# Patient Record
Sex: FEMALE | URBAN - METROPOLITAN AREA
[De-identification: names, ages, dates, MRNs, and addresses within clinical notes are randomized per-mention and may not be internally consistent; named-entity substitution may affect disease eponyms.]

---

## 2017-10-13 ENCOUNTER — OFFICE VISIT (OUTPATIENT)
Dept: FAMILY MEDICINE | Facility: CLINIC | Age: 27
End: 2017-10-13
Payer: COMMERCIAL

## 2017-10-13 VITALS
SYSTOLIC BLOOD PRESSURE: 118 MMHG | DIASTOLIC BLOOD PRESSURE: 84 MMHG | OXYGEN SATURATION: 99 % | WEIGHT: 224 LBS | BODY MASS INDEX: 42.29 KG/M2 | TEMPERATURE: 98.3 F | HEIGHT: 61 IN | HEART RATE: 97 BPM

## 2017-10-13 DIAGNOSIS — F32.1 MODERATE MAJOR DEPRESSION (H): ICD-10-CM

## 2017-10-13 DIAGNOSIS — Z00.00 HEALTHCARE MAINTENANCE: ICD-10-CM

## 2017-10-13 DIAGNOSIS — J01.10 ACUTE NON-RECURRENT FRONTAL SINUSITIS: Primary | ICD-10-CM

## 2017-10-13 LAB
ANION GAP SERPL CALCULATED.3IONS-SCNC: 8 MMOL/L (ref 3–14)
BUN SERPL-MCNC: 8 MG/DL (ref 7–30)
CALCIUM SERPL-MCNC: 8.7 MG/DL (ref 8.5–10.1)
CHLORIDE SERPL-SCNC: 106 MMOL/L (ref 94–109)
CO2 SERPL-SCNC: 26 MMOL/L (ref 20–32)
CREAT SERPL-MCNC: 0.65 MG/DL (ref 0.52–1.04)
GFR SERPL CREATININE-BSD FRML MDRD: >90 ML/MIN/1.7M2
GLUCOSE SERPL-MCNC: 92 MG/DL (ref 70–99)
POTASSIUM SERPL-SCNC: 3.4 MMOL/L (ref 3.4–5.3)
SODIUM SERPL-SCNC: 140 MMOL/L (ref 133–144)
TSH SERPL DL<=0.005 MIU/L-ACNC: 0.8 MU/L (ref 0.4–4)

## 2017-10-13 PROCEDURE — 82306 VITAMIN D 25 HYDROXY: CPT | Performed by: NURSE PRACTITIONER

## 2017-10-13 PROCEDURE — 36415 COLL VENOUS BLD VENIPUNCTURE: CPT | Performed by: NURSE PRACTITIONER

## 2017-10-13 PROCEDURE — 99214 OFFICE O/P EST MOD 30 MIN: CPT | Performed by: NURSE PRACTITIONER

## 2017-10-13 PROCEDURE — 80048 BASIC METABOLIC PNL TOTAL CA: CPT | Performed by: NURSE PRACTITIONER

## 2017-10-13 PROCEDURE — 84443 ASSAY THYROID STIM HORMONE: CPT | Performed by: NURSE PRACTITIONER

## 2017-10-13 RX ORDER — TRAZODONE HYDROCHLORIDE 50 MG/1
50 TABLET, FILM COATED ORAL DAILY PRN
COMMUNITY
Start: 2017-08-10 | End: 2018-04-02

## 2017-10-13 RX ORDER — TRAMADOL HYDROCHLORIDE 50 MG/1
50 TABLET ORAL EVERY 6 HOURS PRN
COMMUNITY
End: 2017-10-13

## 2017-10-13 ASSESSMENT — ANXIETY QUESTIONNAIRES
GAD7 TOTAL SCORE: 18
1. FEELING NERVOUS, ANXIOUS, OR ON EDGE: NEARLY EVERY DAY
IF YOU CHECKED OFF ANY PROBLEMS ON THIS QUESTIONNAIRE, HOW DIFFICULT HAVE THESE PROBLEMS MADE IT FOR YOU TO DO YOUR WORK, TAKE CARE OF THINGS AT HOME, OR GET ALONG WITH OTHER PEOPLE: VERY DIFFICULT
6. BECOMING EASILY ANNOYED OR IRRITABLE: NEARLY EVERY DAY
2. NOT BEING ABLE TO STOP OR CONTROL WORRYING: NEARLY EVERY DAY
3. WORRYING TOO MUCH ABOUT DIFFERENT THINGS: NEARLY EVERY DAY
7. FEELING AFRAID AS IF SOMETHING AWFUL MIGHT HAPPEN: SEVERAL DAYS
5. BEING SO RESTLESS THAT IT IS HARD TO SIT STILL: MORE THAN HALF THE DAYS

## 2017-10-13 ASSESSMENT — PATIENT HEALTH QUESTIONNAIRE - PHQ9
SUM OF ALL RESPONSES TO PHQ QUESTIONS 1-9: 12
5. POOR APPETITE OR OVEREATING: NEARLY EVERY DAY

## 2017-10-13 ASSESSMENT — PAIN SCALES - GENERAL: PAINLEVEL: SEVERE PAIN (7)

## 2017-10-13 NOTE — NURSING NOTE
"Chief Complaint   Patient presents with     URI       Initial /84 (BP Location: Left arm, Patient Position: Chair, Cuff Size: Adult Large)  Pulse 97  Temp 98.3  F (36.8  C) (Oral)  Ht 5' 1\" (1.549 m)  Wt 224 lb (101.6 kg)  LMP 06/15/2017  SpO2 99%  Breastfeeding? No  BMI 42.32 kg/m2 Estimated body mass index is 42.32 kg/(m^2) as calculated from the following:    Height as of this encounter: 5' 1\" (1.549 m).    Weight as of this encounter: 224 lb (101.6 kg).  Medication Reconciliation: complete.  JOSSELYN Ulloa MA      "

## 2017-10-13 NOTE — PROGRESS NOTES
SUBJECTIVE:   Jaylin Mcgill is a 26 year old female who presents to clinic today for the following health issues:      Acute Illness   Acute illness concerns: Sinus  Onset: 2 weeks    Fever: no    Chills/Sweats: YES    Headache (location?): YES- sinus headache    Sinus Pressure:YES- post-nasal drainage and facial pain    Conjunctivitis:  no    Ear Pain: no    Rhinorrhea: YES    Congestion: YES- nasal and chest    Sore Throat: no     Cough: YES-productive of green sputum    Wheeze: no    Decreased Appetite: no    Nausea: no    Vomiting: no    Diarrhea:  no    Dysuria/Freq.: no    Fatigue/Achiness: YES    Sick/Strep Exposure: no     Therapies Tried and outcome: Nightquil and Claritin     Previously following at [facility]  Would like to establish care here  Bilateral sinus pain and pressure for 2 weeks  Recently worsening  Subjective fevers  OTC not helping  Denies SOB    Hx of MDDD  Is following with a psychologist  Tried and failed:  Prozac - weight gain  Wellbutrin - nightmares  Lexapro - weight gain  She is currently taking Trazodone for sleep. 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  Additional history: none    Patient Active Problem List   Diagnosis     Major depressive disorder, recurrent episode, moderate (H)     Vitamin D deficiency     History reviewed. No pertinent surgical history.    Social History   Substance Use Topics     Smoking status: Never Smoker     Smokeless tobacco: Never Used     Alcohol use Yes      Comment: occ.     History reviewed. No pertinent family history.          Reviewed and updated as needed this visit by clinical staffTobacco  Meds  Med Hx  Surg Hx  Fam Hx  Soc Hx      Reviewed and updated as needed this visit by Provider         ROS:  Constitutional, HEENT, cardiovascular, pulmonary, gi and gu systems are  "negative, except as otherwise noted.      OBJECTIVE:   /84 (BP Location: Left arm, Patient Position: Chair, Cuff Size: Adult Large)  Pulse 97  Temp 98.3  F (36.8  C) (Oral)  Ht 5' 1\" (1.549 m)  Wt 224 lb (101.6 kg)  LMP 06/15/2017  SpO2 99%  Breastfeeding? No  BMI 42.32 kg/m2  Body mass index is 42.32 kg/(m^2).  GENERAL: healthy, alert and no distress  HENT: normal cephalic/atraumatic, ear canals and TM's normal, nose and mouth without ulcers or lesions, nasal mucosa edematous , rhinorrhea yellow, oropharynx clear, oral mucous membranes moist and sinuses: frontal tenderness on bilateral  NECK: no adenopathy, no asymmetry, masses, or scars and thyroid normal to palpation  RESP: lungs clear to auscultation - no rales, rhonchi or wheezes  CV: regular rate and rhythm, normal S1 S2, no S3 or S4, no murmur, click or rub, no peripheral edema and peripheral pulses strong    Diagnostic Test Results:  none     ASSESSMENT/PLAN:   1. Acute non-recurrent frontal sinusitis  - Recommend Abx treatment with symptoms present 2 weeks and worsening. Reviewed self cares and over the counter medications for symptomatic relief  - amoxicillin-clavulanate (AUGMENTIN) 875-125 MG per tablet; Take 1 tablet by mouth 2 times daily  Dispense: 20 tablet; Refill: 0    2. Moderate major depression (H)  - Recommend SSRI for depression and anxiety. Will try Zoloft. Reviewed risks and benefits, including increased risk of suicide. Follow up in 1 month  - sertraline (ZOLOFT) 50 MG tablet; Take 1/2 tablet (25 mg) for 1-2 weeks, then increase to 1 tablet orally daily  Dispense: 30 tablet; Refill: 0    3. Healthcare maintenance  - TSH with free T4 reflex  - Vitamin D Deficiency  - Basic metabolic panel    Patient Instructions   Follow up in 1 month for depression  Follow up sooner if mood is worsening  Try 1/2 tablet of Trazodone to see if that helps with morning grogginess      CESAR Sharif [facility]  "

## 2017-10-13 NOTE — PATIENT INSTRUCTIONS
Follow up in 1 month for depression  Follow up sooner if mood is worsening  Try 1/2 tablet of Trazodone to see if that helps with morning grogginess

## 2017-10-13 NOTE — MR AVS SNAPSHOT
"              After Visit Summary   10/13/2017    Jaylin Mcgill    MRN: [id number]           Patient Information     Date Of Birth          1990        Visit Information        Provider Department      10/13/2017 2:00 PM Nancy Devi APRN CNP [facility]        Today's Diagnoses     Acute non-recurrent frontal sinusitis    -  1    Moderate major depression (H)        Healthcare maintenance          Care Instructions    Follow up in 1 month for depression  Follow up sooner if mood is worsening  Try 1/2 tablet of Trazodone to see if that helps with morning grogginess          Follow-ups after your visit        Who to contact     If you have questions or need follow up information about today's clinic visit or your schedule please contact [facility] directly at [phone].  Normal or non-critical lab and imaging results will be communicated to you by MyChart, letter or phone within 4 business days after the clinic has received the results. If you do not hear from us within 7 days, please contact the clinic through MyChart or phone. If you have a critical or abnormal lab result, we will notify you by phone as soon as possible.  Submit refill requests through [email or web address] or call your pharmacy and they will forward the refill request to us. Please allow 3 business days for your refill to be completed.          Additional Information About Your Visit        [employer] Information     [email or web address] lets you send messages to your doctor, view your test results, renew your prescriptions, schedule appointments and more. To sign up, go to [email or web address] . Click on \"Log in\" on the left side of the screen, which will take you to the Welcome page. Then click on \"Sign up Now\" on the right side of the page.     You will be asked to enter the access code listed below, as well as some personal information. Please follow the directions to create your username and " "password.     Your access code is: [email or web address]  Expires: 2018  2:59 PM     Your access code will  in 90 days. If you need help or a new code, please call your [facility] or [phone].        Care EveryWhere ID     This is your Care EveryWhere ID. This could be used by other organizations to access your [facility] medical records  [id number]        Your Vitals Were     Pulse Temperature Height Last Period Pulse Oximetry Breastfeeding?    97 98.3  F (36.8  C) (Oral) 5' 1\" (1.549 m) 06/15/2017 99% No    BMI (Body Mass Index)                   42.32 kg/m2            Blood Pressure from Last 3 Encounters:   10/13/17 118/84    Weight from Last 3 Encounters:   10/13/17 224 lb (101.6 kg)              We Performed the Following     Basic metabolic panel     TSH with free T4 reflex     Vitamin D Deficiency          Today's Medication Changes          These changes are accurate as of: 10/13/17  2:59 PM.  If you have any questions, ask your nurse or doctor.               Start taking these medicines.        Dose/Directions    amoxicillin-clavulanate 875-125 MG per tablet   Commonly known as:  AUGMENTIN   Used for:  Acute non-recurrent frontal sinusitis   Started by:  Nancy Devi APRN CNP        Dose:  1 tablet   Take 1 tablet by mouth 2 times daily   Quantity:  20 tablet   Refills:  0       sertraline 50 MG tablet   Commonly known as:  ZOLOFT   Used for:  Moderate major depression (H)   Started by:  Nancy Devi APRN CNP        Take 1/2 tablet (25 mg) for 1-2 weeks, then increase to 1 tablet orally daily   Quantity:  30 tablet   Refills:  0            Where to get your medicines      These medications were sent to [facility]/pharmacy #[id number] - [place], [place] - [address] [facility]  [address], [facility] [address]     Phone:  [phone]     amoxicillin-clavulanate 875-125 MG per tablet    sertraline 50 MG tablet                Primary Care Provider Office " Phone # Fax #    CESAR Snow [phone] [address] [address]        Equal Access to Services     GREGORY UMANA : Hadii aad ku hadloreneo Soomaali, waaxda luqadaha, qaybta kaalmada adeegyada, john sweeneymateo arnettjanice ellisazael cortes. So [facility] [phone].    ATENCIÓN: Si habla español, tiene a ramos disposición servicios gratuitos de asistencia lingüística. Llame al [phone].    We comply with applicable federal civil rights laws and [place] laws. We do not discriminate on the basis of race, color, national origin, age, disability, sex, sexual orientation, or gender identity.            Thank you!     Thank you for choosing [facility]  for your care. Our goal is always to provide you with excellent care. Hearing back from our patients is one way we can continue to improve our services. Please take a few minutes to complete the written survey that you may receive in the mail after your visit with us. Thank you!             Your Updated Medication List - Protect others around you: Learn how to safely use, store and throw away your medicines at www.disposemymeds.org.          This list is accurate as of: 10/13/17  2:59 PM.  Always use your most recent med list.                   Brand Name Dispense Instructions for use Diagnosis    amoxicillin-clavulanate 875-125 MG per tablet    AUGMENTIN    20 tablet    Take 1 tablet by mouth 2 times daily    Acute non-recurrent frontal sinusitis       sertraline 50 MG tablet    ZOLOFT    30 tablet    Take 1/2 tablet (25 mg) for 1-2 weeks, then increase to 1 tablet orally daily    Moderate major depression (H)       DAVID 13.5 MG Iud   Generic drug:  Levonorgestrel           traZODone 50 MG tablet    DESYREL     Take 50 mg by mouth daily as needed

## 2017-10-14 ASSESSMENT — ANXIETY QUESTIONNAIRES: GAD7 TOTAL SCORE: 18

## 2017-10-15 LAB — DEPRECATED CALCIDIOL+CALCIFEROL SERPL-MC: 7 UG/L (ref 20–75)

## 2017-10-16 ENCOUNTER — TELEPHONE (OUTPATIENT)
Dept: FAMILY MEDICINE | Facility: CLINIC | Age: 27
End: 2017-10-16

## 2017-10-16 DIAGNOSIS — E55.9 VITAMIN D DEFICIENCY: Primary | ICD-10-CM

## 2017-10-16 NOTE — TELEPHONE ENCOUNTER
Please let patient know that her vitamin D is low. This can contribute to fatigue and lethargy. I sent a prescription for high dose vitamin D to your pharmacy. Take 1 capsule once a week for 8 weeks. After that take a daily low dose vitamin D. This can be bought over the counter. Look for 2000 IU vitamin D daily.   Your other labs look good

## 2017-10-16 NOTE — TELEPHONE ENCOUNTER
Attempted to call patient at [phone] (home), no answer.  Left VM to return call to RN Triage line.    Florinda Briseno RN  [facility] [[email or web address]]

## 2017-10-16 NOTE — TELEPHONE ENCOUNTER
"Call taken, advised patient of her lab results, vit D is low, Rx sent then to start over the counter Vit D when the weekly high dose is done in 8 weeks.  She says she will call to schedule the advised 1 month follow up for \"mood\" when she knows her work schedule.    Rupinder Parra RN  [facility]      "

## 2017-12-09 DIAGNOSIS — E55.9 VITAMIN D DEFICIENCY: ICD-10-CM

## 2017-12-12 RX ORDER — METHOCARBAMOL 750 MG/1
TABLET ORAL
Qty: 8 CAPSULE | Refills: 0 | OUTPATIENT
Start: 2017-12-12

## 2017-12-12 NOTE — TELEPHONE ENCOUNTER
"Vit D 50,000 units      Last Written Prescription Date: 10/16/17  Last Fill Quantity: 8,  # refills: 0   Last Office Visit with [facility], [facility] or [facility] prescribing provider: 10/13/17 to establish care                                         Next 5 appointments (look out 90 days)     Dec 18, 2017  6:20 PM CST   [facility]-C   [facility]    [address] [place] [id number] [phone]                  See plan regarding vitamin D from phone call result note 10/16/17:    Please let patient know that her vitamin D is low. This can contribute to fatigue and lethargy. I sent a prescription for high dose vitamin D to your pharmacy. Take 1 capsule once a week for 8 weeks. After that take a daily low dose vitamin D. This can be bought over the counter. Look for 2000 IU vitamin D daily.   Your other labs look good      I called patient and advised her of plan, she verbalized understanding.    \"Refusal\" routed back to pharmacy with note.  Rupinder Parra RN  [facility]        "

## 2018-01-28 ENCOUNTER — HEALTH MAINTENANCE LETTER (OUTPATIENT)
Age: 28
End: 2018-01-28

## 2018-03-06 ENCOUNTER — TELEPHONE (OUTPATIENT)
Dept: FAMILY MEDICINE | Facility: CLINIC | Age: 28
End: 2018-03-06

## 2018-03-06 NOTE — LETTER
March 19, 2018    Jaylin Mcgill  [address]  [place] [address]    Dear Jaylin    We care about your health and have reviewed your health plan. We have reviewed your medical conditions, medication list, and lab results and are making recommendations based on this review, to better manage your health.    You are in particular need of attention regarding:  - Your Depression      Here is a list of Health Maintenance topics that are due now or due soon:  Health Maintenance Due   Topic Date Due     HPV IMMUNIZATION (1 of 3 - Female 3 Dose Series) 11/15/2003     DEPRESSION ACTION PLAN Q1 YR  11/15/2010     PAP SCREENING Q3 YR (SYSTEM ASSIGNED)  11/15/2013     PHQ-9 Q6 MONTHS  04/13/2018     We will be calling you in the next couple of weeks to help you schedule any appointments that are needed.  Please call us at [phone] (or use [facility]) to address the above recommendations.     Thank you for trusting [facility] and we appreciate the opportunity to serve you.  We look forward to supporting your healthcare needs in the future.    Healthy Regards,    Nancy Devi, ALEXANDER/cm

## 2018-03-06 NOTE — TELEPHONE ENCOUNTER
Panel Management Review      Patient has the following on her problem list:     Depression / Dysthymia review    Measure:  Needs PHQ-9 score of 4 or less during index window.  Administer PHQ-9 and if score is 5 or more, send encounter to provider for next steps.    5   7 month window range: 3/13/18-5/13/18    PHQ-9 SCORE 10/13/2017   Total Score 12       If PHQ-9 recheck is 5 or more, route to provider for next steps.    Patient is due for:  PHQ9 and DAP      Composite cancer screening  Chart review shows that this patient is due/due soon for the following Pap Smear  Summary:    Patient is due/failing the following:   DAP, PAP and PHYSICAL    Action needed:   Patient needs office visit for pap screening. and Patient needs to do PHQ9.    Type of outreach:    Phone, spoke to patient.  completed PHQ9 over the phone, her score is 7.    Questions for provider review:    Sending to PCP for review and advice.   When finish reviewing please send back to Any BENÍTEZ to close chart, thank you.                                                                                                                                 JOSSELYN Ulloa MA       Chart routed to Provider .

## 2018-03-06 NOTE — LETTER
Please complete the PHQ9 questionnaire and mail back to the clinic in the self addressed envelope provided, thank you.    Enclosed with this letter is a questionnaire about depression for your upcoming visit. Please fill this out and mail back or contact us. We care about you and want to make sure you get the best care possible. If at any time you feel unsafe or have concerns about safety of others please take  immediate action by calling [phone], for Mental Health Crisis phone support 24 hours a day, 365 days per year. As always, you can also go the your local Emergency Room, or call 911 if you have immediate safety concerns.    Nancy Devi CNP/cm

## 2018-03-19 NOTE — TELEPHONE ENCOUNTER
Quality and PHQ9 questionnaire letter mailed to patient as a reminder of HM items due.  JOSSELYN Ulloa MA

## 2018-03-23 NOTE — TELEPHONE ENCOUNTER
Nancy I spoke with the patient and she has no insurance at this time, her employer doesn't supply it for her and she doesn't qualify for Mnsure, she is not a citizen, she is her on a work visa. Is interested in the phone visit if you think it is appropriate.  JOSSELYN Ulloa MA

## 2018-03-23 NOTE — TELEPHONE ENCOUNTER
Please call patient to help schedule follow up for depression. She was supposed to follow up 1 month after last appointment in October. PHQ9 score shows we are still poorly managing her depression

## 2018-03-24 ASSESSMENT — PATIENT HEALTH QUESTIONNAIRE - PHQ9: SUM OF ALL RESPONSES TO PHQ QUESTIONS 1-9: 7

## 2018-03-26 NOTE — TELEPHONE ENCOUNTER
Patient returning call, left message on TC line.  OK to leave detailed message on home/cell number, listed.

## 2018-04-02 ENCOUNTER — VIRTUAL VISIT (OUTPATIENT)
Dept: FAMILY MEDICINE | Facility: CLINIC | Age: 28
End: 2018-04-02

## 2018-04-02 DIAGNOSIS — F33.1 MAJOR DEPRESSIVE DISORDER, RECURRENT EPISODE, MODERATE (H): Primary | ICD-10-CM

## 2018-04-02 PROCEDURE — 99213 OFFICE O/P EST LOW 20 MIN: CPT | Performed by: NURSE PRACTITIONER

## 2018-04-02 RX ORDER — SERTRALINE HYDROCHLORIDE 100 MG/1
100 TABLET, FILM COATED ORAL DAILY
Qty: 30 TABLET | Refills: 1 | Status: SHIPPED | OUTPATIENT
Start: 2018-04-02

## 2018-04-02 NOTE — PROGRESS NOTES
"Jaylin Mcgill is a 27 year old female who is being evaluated via a telephone visit.      The patient has been notified of following:     \"This telephone visit will be conducted via a call between you and your physician/provider. We have found that certain health care needs can be provided without the need for a physical exam.  This service lets us provide the care you need with a short phone conversation.  If a prescription is necessary we can send it directly to your pharmacy.  If lab work is needed we can place an order for that and you can then stop by our lab to have the test done at a later time.    We will bill your insurance company for this service.  Please check with your medical insurance if this type of visit is covered. You may be responsible for the cost of this type of visit if insurance coverage is denied.  The typical cost is $30 (10min), $59(11-20min) and $85 (21-30min).  Most often these visits are shorter than 10 minutes.    If during the course of the call the physician/provider feels a telephone visit is not appropriate, you will not be charged for this service. \"     Consent has been obtained for this service by 1 care team member:yes. See the scanned image in the medical record.    Preferred patient phone number to be used for this call: [phone]     Jaylin Mcgill complains of  Depression recheck phq-9 was done on 03/28/2018    No past medical history on file.   Social History     Social History     Marital status: Single     Spouse name: N/A     Number of children: N/A     Years of education: N/A     Occupational History     Not on file.     Social History Main Topics     Smoking status: Never Smoker     Smokeless tobacco: Never Used     Alcohol use Yes      Comment: occ.     Drug use: No     Sexual activity: Yes     Birth control/ protection: IUD     Other Topics Concern     Not on file     Social History Narrative     No narrative on file     ALLERGIES  Review of patient's " allergies indicates no known allergies.        Marta Araiza CMA   (MA signature)    Additional provider notes:  She was seen in clinic 10/13/17 for MDD  Was seeing a psychologist  Started on sertraline 50 mg and advised to follow up in 1 month which was not done  She had slight nausea when just starting medication, otherwise tolerated without side effects  Stopped taking after 1 month d/t being uninsured. Ran out of refill  She changed jobs and lost insurance Dec 2017  She is currently uninsured  She is at [employer] and working    She was contacted by our MA 3/23/18 for panel management  PHQ9: 7, improved from 13 previously  She is uninsured and could not afford to schedule clinic visit    She continues to be depressed  Denies suicidal ideation or thoughts of self harm  Overwhelmed with school  Occasional alcohol use  Denies drug use  Waking up several times a night  Was on Trazodone 50 mg in the past. Made her drowsy              Assessment/Plan:  Major Depressive Disorder, recurrent episode, moderate (H)    Will restart sertraline. After 4 weeks, will increase to 100 mg. Ideally would have her follow up in 4-8 weeks. This may be difficult d/t cost so ok to follow up (phone ok) in 8-12 weeks if responding well to sertraline. She is given number for suicide prevention lifeline. Referral to our care coordinator to help with insurance and if there are any therapists with sliding scale. Will treat depression first.    Total time spent on this phone visit with the patient = 14 minutes     I have reviewed the note as documented above.  This accurately captures the substance of my conversation with the patient,  Nancy Devi, CNP

## 2018-04-02 NOTE — MR AVS SNAPSHOT
After Visit Summary   4/2/2018    Jaylin Mcgill    MRN: [id number]           Patient Information     Date Of Birth          1990        Visit Information        Provider Department      4/2/2018 2:00 PM Nancy Devi APRN CNP [facility]        Today's Diagnoses     Major depressive disorder, recurrent episode, moderate (H)    -  1       Follow-ups after your visit        Additional Services     CARE COORDINATION REFERRAL       Services are provided by a Care Coordinator for people with complex needs such as: medical, social, or financial troubles.  The Care Coordinator works with the patient and their Primary Care Provider to determine health goals, obtain resources, achieve outcomes, and develop care plans that help coordinate the patient's care.     Reason for Referral: She started new job in December 2017. New job does not provide insurance. She is also student at [employer]. Can you help her see if she would qualify for state insurance or any advice on navigating through process of enrolling in insurance. She is not a [place] citizen. I am treating her for depression. She was previously seeing therapist but no longer d/t insurance. Are there any therapists that do low cost or sliding fee?    Additional pertinent details:  See above    Clinical Staff have discussed the Care Coordination Referral with the patient and/or caregiver: yes                  Follow-up notes from your care team     Return in about 1 month (around 5/2/2018) for Follow up.      Who to contact     If you have questions or need follow up information about today's clinic visit or your schedule please contact [facility] directly at [phone].  Normal or non-critical lab and imaging results will be communicated to you by MyChart, letter or phone within 4 business days after the clinic has received the results. If you do not hear from us within 7 days, please contact the  "clinic through [employer] or phone. If you have a critical or abnormal lab result, we will notify you by phone as soon as possible.  Submit refill requests through [employer] or call your pharmacy and they will forward the refill request to us. Please allow 3 business days for your refill to be completed.          Additional Information About Your Visit        [employer] [facility] Information     [employer] lets you send messages to your doctor, view your test results, renew your prescriptions, schedule appointments and more. To sign up, go to [email or web address] . Click on \"Log in\" on the left side of the screen, which will take you to the Welcome page. Then click on \"Sign up Now\" on the right side of the page.     You will be asked to enter the access code listed below, as well as some personal information. Please follow the directions to create your username and password.     Your access code is: [email or web address]  Expires: 2018  2:57 PM     Your access code will  in 90 days. If you need help or a new code, please call your [place] clinic or [phone].        Care EveryWhere ID     This is your Care EveryWhere ID. This could be used by other organizations to access your [place] medical records  [id number]         Blood Pressure from Last 3 Encounters:   10/13/17 118/84    Weight from Last 3 Encounters:   10/13/17 224 lb (101.6 kg)              We Performed the Following     CARE COORDINATION REFERRAL          Today's Medication Changes          These changes are accurate as of 18  2:57 PM.  If you have any questions, ask your nurse or doctor.               These medicines have changed or have updated prescriptions.        Dose/Directions    * sertraline 50 MG tablet   Commonly known as:  ZOLOFT   This may have changed:  Another medication with the same name was added. Make sure you understand how and when to take each.   Used for:  Moderate major depression (H)   Changed by:  Nancy Devi APRN CNP        " Take 1/2 tablet (25 mg) for 1-2 weeks, then increase to 1 tablet orally daily   Quantity:  30 tablet   Refills:  0       * sertraline 50 MG tablet   Commonly known as:  ZOLOFT   This may have changed:  You were already taking a medication with the same name, and this prescription was added. Make sure you understand how and when to take each.   Used for:  Major depressive disorder, recurrent episode, moderate (H)   Changed by:  Nancy Devi APRN CNP        Take 1/2 tablet (25 mg) for 1-2 weeks, then increase to 1 tablet orally daily   Quantity:  30 tablet   Refills:  0       * sertraline 100 MG tablet   Commonly known as:  ZOLOFT   This may have changed:  You were already taking a medication with the same name, and this prescription was added. Make sure you understand how and when to take each.   Used for:  Major depressive disorder, recurrent episode, moderate (H)   Changed by:  Nancy Devi APRN CNP        Dose:  100 mg   Take 1 tablet (100 mg) by mouth daily   Quantity:  30 tablet   Refills:  1       * Notice:  This list has 3 medication(s) that are the same as other medications prescribed for you. Read the directions carefully, and ask your doctor or other care provider to review them with you.         Where to get your medicines      These medications were sent to [facility]/pharmacy #[id number] - [place], [address]NER OF [address], [facility] [address]     Phone:  [phone]     sertraline 100 MG tablet    sertraline 50 MG tablet                Primary Care Provider Office Phone # Fax #    CESAR Snow [phone] [address] [facility] [address]        Equal Access to Services     [facility] Center[facility]CTOR[facility] AH: Hadii ebony Zapata, wabárbarada luqadaha, qaybta kaalmadenice calhoun, john cortes. So [facility] [phone].    ATENCIÓN: Si habla español, tiene a ramos disposición  servicios gratuitos de asistencia lingüística. Irving young [phone].    We comply with applicable federal civil rights laws and [place] laws. We do not discriminate on the basis of race, color, national origin, age, disability, sex, sexual orientation, or gender identity.            Thank you!     Thank you for choosing [facility]  for your care. Our goal is always to provide you with excellent care. Hearing back from our patients is one way we can continue to improve our services. Please take a few minutes to complete the written survey that you may receive in the mail after your visit with us. Thank you!             Your Updated Medication List - Protect others around you: Learn how to safely use, store and throw away your medicines at www.disposemymeds.org.          This list is accurate as of 4/2/18  2:57 PM.  Always use your most recent med list.                   Brand Name Dispense Instructions for use Diagnosis    * sertraline 50 MG tablet    ZOLOFT    30 tablet    Take 1/2 tablet (25 mg) for 1-2 weeks, then increase to 1 tablet orally daily    Moderate major depression (H)       * sertraline 50 MG tablet    ZOLOFT    30 tablet    Take 1/2 tablet (25 mg) for 1-2 weeks, then increase to 1 tablet orally daily    Major depressive disorder, recurrent episode, moderate (H)       * sertraline 100 MG tablet    ZOLOFT    30 tablet    Take 1 tablet (100 mg) by mouth daily    Major depressive disorder, recurrent episode, moderate (H)       DAVID 13.5 MG Iud   Generic drug:  Levonorgestrel           * Notice:  This list has 3 medication(s) that are the same as other medications prescribed for you. Read the directions carefully, and ask your doctor or other care provider to review them with you.

## 2018-04-05 ENCOUNTER — PATIENT OUTREACH (OUTPATIENT)
Dept: CARE COORDINATION | Facility: CLINIC | Age: 28
End: 2018-04-05

## 2018-04-05 NOTE — PROGRESS NOTES
Clinic Care Coordination Contact-Social Work Initial Call/Assessment  UT/Voicemail    Clinical Data: Care Coordinator Outreach.  PCP request for SW to assist with insurance resources.  Patient has new employment but it does not provide insurance.  Patient is also not a [place] citizen.  PCP is treating Patient for depression, needs low cost counseling resources.  SW will send [facility] information for insurance application and information for [facility] for low cost counseling resources.      Outreach attempted x 8 ([phone] ).  Left vague message on generic voicemail with call back information and requested return call.    Plan: Care Coordinator mailed out care coordination introduction letter today.  Care Coordinator will call Patient in 1 week if no return call    GARY Sharma, MSRODERICK   [facility]   [phone]  4/5/2018 4:52 PM    Clinic Care Coordination Contact-Social Work Initial Call/Assessment  UT/Voicemail    Clinical Data: Care Coordinator Outreach.  PCP request for SW to assist with insurance resources.  Patient has new employment but it does not provide insurance.  Patient is also not a [place] citizen.  PCP is treating Patient for depression, needs low cost counseling resources.  SW will send [facility] information for insurance application and information for [facility] for low cost counseling resources.      Outreach attempted x 2 ([phone] ).  Voice mail is full.    Plan: 1) SW will call Patient in 1 week for update and needs assessment, will likely close on next call if cannot reach Patient    GARY Sharma, KATERYNA   [facility]   [phone]  5/11/2018 10:21 AM    Clinic Care Coordination Contact-Social Work Initial Call/Assessment  UT/Voicemail    Clinical Data: Care Coordinator Outreach.  PCP request for SW to assist with insurance resources.  Patient has new employment but it  does not provide insurance.  Patient is also not a [place] citizen.  PCP is treating Patient for depression, needs low cost counseling resources.  SW will send [facility] information for insurance application and information for [facility] for low cost counseling resources.      Outreach attempted x 2 ([phone] ).  Voice mail is full.      Plan: 1) SW will close to Care Coordination as cannot reach Patient, will update PCP    Ysabel Grant, GARY, MSW   [facility]   [phone]  9/5/2018 4:29 PM

## 2018-04-05 NOTE — LETTER
[facility] CARE COORDINATION    April 5, 2018    Jaylin Mcgill  [address]  [place] [address]    Dear Jaylin,    I am a clinic  at the [facility].  I wanted to introduce myself and provide you with my contact information so that you can call me with questions or concerns about your health care. Below is a description of clinic care coordination and how I can further assist you.     The clinic care coordinator is a registered nurse and/or  who understand the health care system. The goal of clinic care coordination is to help you manage your health and improve access to the [facility] in the most efficient manner.     To apply for health insurance, please call [facility] ([phone]), you may also check their web site:    [email or web address]?[email or web address]%[email or web address]%26start%3D41    I have sent information for low cost counseling resources at the [facility].  They will see those that are uninsured for free counseling    Please feel free to contact me at [phone] with any questions or concerns. We at [facility] are focused on providing you with the highest-quality healthcare experience possible and that all starts with you.     Sincerely,     Ysabel Grant, GARY, MSW  , Clinic Care Coordination   [facility]  [phone]    Enclosed: I have enclosed a copy of a 24 Hour Access Plan. This has helpful phone numbers for you to call when needed. Please keep this in an easy to access place to use as needed.

## 2018-04-05 NOTE — LETTER
Health Care Home - Access Care Plan    About Me  Patient Name:  Jaylin Mcgill    YOB: 1990  Age:                             27 year old   Abimael MRN:            [id number] Telephone Information:     Home Phone [phone]   Mobile [phone]       Address:    [address] [place] [address] Email address:  No e-mail address on record      Emergency Contact(s)  Name Relationship Lgl Grd Work Phone Home Phone Mobile Phone   1. TROY CIFUENTES* Mother   [phone]    2. SEE JU* Father   [phone]              Health Maintenance:  Not assessed     My Access Plan  Medical Emergency 911   Questions or concerns during clinic hours Primary Clinic Line, I will call the clinic directly: [facility] [phone]   24 Hour Appointment Line [phone] or  [phone]) (toll free)   24 Hour Nurse Line [phone] (toll free)   Questions or concerns outside clinic hours 24 Hour Appointment Line, I will call the after-hours on-call line:   [facility] [phone] or [phone]) (toll-free)   Preferred Urgent Care     Preferred Hospital     Preferred Pharmacy [id number] - [place], [place] - [address] CORNER OF [facility]     Behavioral Health Crisis Line The National Suicide Prevention Lifeline at 1-508.307.8129 or 915     My Care Team Members  Patient Care Team       Relationship Specialty Notifications Start End    Nancy Devi APRN CNP PCP - General Nurse Practitioner - Adult Health  10/13/17     Phone: [phone] Fax: [phone]         [address] [facility] [address]    Ysabel Grant BSW Clinic Care Coordinator Primary Care - CC  4/5/18     Phone: [phone] Fax: [phone]            My Medical and Care Information  Problem List   Patient Active Problem List   Diagnosis     Major depressive disorder, recurrent episode, moderate (H)      Vitamin D deficiency